# Patient Record
Sex: MALE | Race: WHITE | NOT HISPANIC OR LATINO | Employment: STUDENT | URBAN - NONMETROPOLITAN AREA
[De-identification: names, ages, dates, MRNs, and addresses within clinical notes are randomized per-mention and may not be internally consistent; named-entity substitution may affect disease eponyms.]

---
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
## 2018-07-30 ENCOUNTER — TELEPHONE (OUTPATIENT)
Dept: FAMILY MEDICINE | Facility: OTHER | Age: 18
End: 2018-07-30

## 2018-07-30 DIAGNOSIS — L60.0 INGROWING LEFT GREAT TOENAIL: Primary | ICD-10-CM

## 2018-07-30 NOTE — TELEPHONE ENCOUNTER
To: Nurse covering for Dr. Nolberto Delgado  Mom of patient is calling for son and would like a return phone call today (7/30/18).  She is requesting a referral to [facility] to take care of ingrown toenail problem.  Please call her back to discuss @ [phone] (this is her work number)  Thank you

## 2018-09-21 ENCOUNTER — TELEPHONE (OUTPATIENT)
Dept: PEDIATRICS | Facility: OTHER | Age: 18
End: 2018-09-21

## 2018-09-21 NOTE — TELEPHONE ENCOUNTER
Spoke with mom Elyse.  Insurance does not consider [facility] as in-network, and the referral needs to come from a primary clinic.  She had asked if it could come from the podiatrist but would not be accepted. Spoke with Va and she stated that he needs to be seen at a [facility] in his area ([facility]).    Rachel Peoples MA on 9/21/2018 at 4:49 PM

## 2018-09-21 NOTE — TELEPHONE ENCOUNTER
2:37 PM    Reason for Call: Phone Call    Description: Patients mother called stating that her son went into [facility] @ the [facility].  Pt has an appt set for Tues / Wed of next week for his toe. Mother states that he needs a referral for insurance to cover his foot procedure.  I did see Va Yu has seen the pt in the past for ingrown toenails. It was suggest by the mother if I would ask if Va could do a referral.      Was an appointment offered for this call? No  If yes : Appointment type              Date    Preferred method for responding to this message: Telephone Call  What is your phone number ? preferrred to be called @ her work no. Which is [phone]. Mother works til 5    If we cannot reach you directly, may we leave a detailed response at the number you provided? Yes    Can this message wait until your PCP/provider returns, if available today? Not applicable, provider is in today.    Koki Amaro

## 2018-09-21 NOTE — TELEPHONE ENCOUNTER
I saw the patient once 9 months ago for ear infection. I haven't even seen his feet. The referral should have been sent by the health services at the [facility], if that is what they saw him for.

## 2018-09-24 NOTE — TELEPHONE ENCOUNTER
Spoke with mom, Elyse, to let her know that Levi will need to be seen by a provider in the [facility], that is in network, to get the referral for the surgery.  We are unable to provide the referral because a provider has not physically seen the patient for the ingrown toenail.    Rachle Peoples MA on 9/24/2018 at 11:43 AM

## 2019-01-10 ENCOUNTER — OFFICE VISIT (OUTPATIENT)
Dept: PEDIATRICS | Facility: OTHER | Age: 19
End: 2019-01-10
Attending: NURSE PRACTITIONER
Payer: COMMERCIAL

## 2019-01-10 VITALS
WEIGHT: 176 LBS | RESPIRATION RATE: 18 BRPM | SYSTOLIC BLOOD PRESSURE: 116 MMHG | DIASTOLIC BLOOD PRESSURE: 78 MMHG | OXYGEN SATURATION: 100 % | TEMPERATURE: 97.7 F | HEIGHT: 70 IN | BODY MASS INDEX: 25.2 KG/M2 | HEART RATE: 62 BPM

## 2019-01-10 DIAGNOSIS — H66.003 ACUTE SUPPURATIVE OTITIS MEDIA OF BOTH EARS WITHOUT SPONTANEOUS RUPTURE OF TYMPANIC MEMBRANES, RECURRENCE NOT SPECIFIED: Primary | ICD-10-CM

## 2019-01-10 PROCEDURE — 99213 OFFICE O/P EST LOW 20 MIN: CPT | Performed by: NURSE PRACTITIONER

## 2019-01-10 RX ORDER — AMOXICILLIN 875 MG
875 TABLET ORAL 2 TIMES DAILY
Qty: 14 TABLET | Refills: 0 | Status: SHIPPED | OUTPATIENT
Start: 2019-01-10 | End: 2019-01-17

## 2019-01-10 ASSESSMENT — ANXIETY QUESTIONNAIRES
3. WORRYING TOO MUCH ABOUT DIFFERENT THINGS: NOT AT ALL
7. FEELING AFRAID AS IF SOMETHING AWFUL MIGHT HAPPEN: NOT AT ALL
5. BEING SO RESTLESS THAT IT IS HARD TO SIT STILL: NOT AT ALL
2. NOT BEING ABLE TO STOP OR CONTROL WORRYING: NOT AT ALL
IF YOU CHECKED OFF ANY PROBLEMS ON THIS QUESTIONNAIRE, HOW DIFFICULT HAVE THESE PROBLEMS MADE IT FOR YOU TO DO YOUR WORK, TAKE CARE OF THINGS AT HOME, OR GET ALONG WITH OTHER PEOPLE: NOT DIFFICULT AT ALL
GAD7 TOTAL SCORE: 0
6. BECOMING EASILY ANNOYED OR IRRITABLE: NOT AT ALL
1. FEELING NERVOUS, ANXIOUS, OR ON EDGE: NOT AT ALL
4. TROUBLE RELAXING: NOT AT ALL

## 2019-01-10 ASSESSMENT — MIFFLIN-ST. JEOR: SCORE: 1814.58

## 2019-01-10 ASSESSMENT — PAIN SCALES - GENERAL: PAINLEVEL: NO PAIN (1)

## 2019-01-10 ASSESSMENT — PATIENT HEALTH QUESTIONNAIRE - PHQ9: SUM OF ALL RESPONSES TO PHQ QUESTIONS 1-9: 0

## 2019-01-10 NOTE — NURSING NOTE
"Chief Complaint   Patient presents with     Otalgia       Initial /78 (BP Location: Right arm, Patient Position: Sitting, Cuff Size: Adult Regular)   Pulse 62   Temp 97.7  F (36.5  C) (Tympanic)   Resp 18   Ht 1.778 m (5' 10\")   Wt 79.8 kg (176 lb)   SpO2 100%   BMI 25.25 kg/m   Estimated body mass index is 25.25 kg/m  as calculated from the following:    Height as of this encounter: 1.778 m (5' 10\").    Weight as of this encounter: 79.8 kg (176 lb).  Medication Reconciliation: complete    Ashley A. Lechevalier, LPN  "

## 2019-01-10 NOTE — PROGRESS NOTES
SUBJECTIVE:   Levi Hawkins is a 18 year old male who presents to clinic today for the following health issues:    Ear Pain      Duration: 2-3 Days    Description (location/character/radiation): Pain and ringing in both ears. Pain started in his left ear, but ringing in right ear    Intensity:  mild, moderate    Accompanying signs and symptoms: Had congestion about 10-14 days ago that resolved within about a week.    History (similar episodes/previous evaluation): None    Precipitating or alleviating factors: laying down makes the ringing and pain worse. No difficulty with sleep.    Therapies tried and outcome: None          Problem list and histories reviewed & adjusted, as indicated.  Additional history: as documented    Patient Active Problem List   Diagnosis     Intermittent asthma     Bronchospasm, exercise-induced     Past Surgical History:   Procedure Laterality Date     CIRCUMCISION         Social History     Tobacco Use     Smoking status: Never Smoker     Smokeless tobacco: Never Used     Tobacco comment: no passive exposure   Substance Use Topics     Alcohol use: No     Family History   Problem Relation Age of Onset     Heart Disease Maternal Grandmother      Cancer Maternal Grandmother         lung cancer     Cancer - colorectal Maternal Grandfather      Eye Disorder Maternal Grandfather      Other - See Comments Paternal Grandmother         brain anerysm         Current Outpatient Medications   Medication Sig Dispense Refill     adapalene (DIFFERIN) 0.1 % cream APPLY TOPICALLY EVERY EVENING FOR ACNE (Patient not taking: Reported on 1/10/2019) 45 g 1     albuterol (2.5 MG/3ML) 0.083% nebulizer solution USE 1 VIAL IN NEBULIZER 3 TIMES A DAY (Patient not taking: Reported on 1/10/2019) 90 vial 0     IBUPROFEN PO Take 200 mg by mouth Takes as directed PRN pain       loratadine (CLARITIN) 10 MG tablet Take 10 mg by mouth daily       VENTOLIN  (90 BASE) MCG/ACT inhaler INHALE 2 PUFFS INTO THE  "LUNGS EVERY 4 TO 6 HOURS AS NEEDED FOR SHORTNESS OF BREATH (Patient not taking: Reported on 1/10/2019) 18 g 0     Allergies   Allergen Reactions     Birch Trees      Cats      Cat/feline product derivatives     Seasonal Allergies        Reviewed and updated as needed this visit by clinical staff  Tobacco  Allergies  Meds  Problems  Med Hx  Surg Hx  Fam Hx  Soc Hx        Reviewed and updated as needed this visit by Provider  Tobacco  Allergies  Meds  Problems  Med Hx  Surg Hx  Fam Hx  Soc Hx          ROS:  Constitutional, HEENT, cardiovascular, pulmonary, gi and gu systems are negative, except as otherwise noted.    OBJECTIVE:     /78 (BP Location: Right arm, Patient Position: Sitting, Cuff Size: Adult Regular)   Pulse 62   Temp 97.7  F (36.5  C) (Tympanic)   Resp 18   Ht 1.778 m (5' 10\")   Wt 79.8 kg (176 lb)   SpO2 100%   BMI 25.25 kg/m    Body mass index is 25.25 kg/m .  GENERAL: healthy, alert and no distress  EYES: Eyes grossly normal to inspection, PERRL and conjunctivae and sclerae normal  HENT: normal cephalic/atraumatic, both ears: canals normal, TMs injected with purulent fluid noted, nose and mouth without ulcers or lesions, oropharynx clear and oral mucous membranes moist  NECK: no adenopathy, no asymmetry, masses, or scars and thyroid normal to palpation  RESP: lungs clear to auscultation - no rales, rhonchi or wheezes  CV: regular rate and rhythm, normal S1 S2, no S3 or S4, no murmur, click or rub, no peripheral edema and peripheral pulses strong    Diagnostic Test Results:  none     ASSESSMENT/PLAN:     1. Acute suppurative otitis media of both ears without spontaneous rupture of tympanic membranes, recurrence not specified  Amoxicillin twice daily for 7 days. Ensure adequate fluid intake, humidification. Acetaminophen and/or ibuprofen as needed for any discomfort.  - amoxicillin (AMOXIL) 875 MG tablet; Take 1 tablet (875 mg) by mouth 2 times daily for 7 days  Dispense: 14 " tablet; Refill: 0    Follow up: if symptoms are worsening or fail to improve.    BEAU Cullen [facility]

## 2019-01-11 ASSESSMENT — ANXIETY QUESTIONNAIRES: GAD7 TOTAL SCORE: 0

## 2019-02-06 DIAGNOSIS — L70.0 ACNE VULGARIS: ICD-10-CM

## 2019-02-07 RX ORDER — ADAPALENE 0.1 G/100G
CREAM TOPICAL
Qty: 45 G | Refills: 0 | Status: SHIPPED | OUTPATIENT
Start: 2019-02-07 | End: 2022-08-11

## 2019-02-07 NOTE — TELEPHONE ENCOUNTER
adapalene (DIFFERIN) 0.1 % external cream  Last Written Prescription Date:  8/18/16  Last Fill Quantity: 45g,   # refills: 1  Last Office Visit: 1/10/19  Future Office visit:

## 2020-01-02 ENCOUNTER — TELEPHONE (OUTPATIENT)
Dept: PEDIATRICS | Facility: OTHER | Age: 20
End: 2020-01-02

## 2020-01-02 ENCOUNTER — OFFICE VISIT (OUTPATIENT)
Dept: FAMILY MEDICINE | Facility: OTHER | Age: 20
End: 2020-01-02
Attending: PHYSICIAN ASSISTANT
Payer: COMMERCIAL

## 2020-01-02 VITALS
SYSTOLIC BLOOD PRESSURE: 122 MMHG | HEART RATE: 66 BPM | OXYGEN SATURATION: 98 % | TEMPERATURE: 97.4 F | BODY MASS INDEX: 25.05 KG/M2 | WEIGHT: 175 LBS | DIASTOLIC BLOOD PRESSURE: 74 MMHG | HEIGHT: 70 IN

## 2020-01-02 DIAGNOSIS — H65.92 OME (OTITIS MEDIA WITH EFFUSION), LEFT: Primary | ICD-10-CM

## 2020-01-02 PROCEDURE — 99213 OFFICE O/P EST LOW 20 MIN: CPT | Performed by: PHYSICIAN ASSISTANT

## 2020-01-02 RX ORDER — AMOXICILLIN 875 MG
875 TABLET ORAL 2 TIMES DAILY
Qty: 20 TABLET | Refills: 0 | Status: SHIPPED | OUTPATIENT
Start: 2020-01-02 | End: 2022-08-11

## 2020-01-02 ASSESSMENT — ASTHMA QUESTIONNAIRES
QUESTION_1 LAST FOUR WEEKS HOW MUCH OF THE TIME DID YOUR ASTHMA KEEP YOU FROM GETTING AS MUCH DONE AT WORK, SCHOOL OR AT HOME: NONE OF THE TIME
QUESTION_2 LAST FOUR WEEKS HOW OFTEN HAVE YOU HAD SHORTNESS OF BREATH: NOT AT ALL
ACT_TOTALSCORE: 25
QUESTION_5 LAST FOUR WEEKS HOW WOULD YOU RATE YOUR ASTHMA CONTROL: COMPLETELY CONTROLLED
QUESTION_4 LAST FOUR WEEKS HOW OFTEN HAVE YOU USED YOUR RESCUE INHALER OR NEBULIZER MEDICATION (SUCH AS ALBUTEROL): NOT AT ALL
QUESTION_3 LAST FOUR WEEKS HOW OFTEN DID YOUR ASTHMA SYMPTOMS (WHEEZING, COUGHING, SHORTNESS OF BREATH, CHEST TIGHTNESS OR PAIN) WAKE YOU UP AT NIGHT OR EARLIER THAN USUAL IN THE MORNING: NOT AT ALL

## 2020-01-02 ASSESSMENT — PAIN SCALES - GENERAL: PAINLEVEL: MODERATE PAIN (4)

## 2020-01-02 ASSESSMENT — MIFFLIN-ST. JEOR: SCORE: 1805.04

## 2020-01-02 NOTE — PATIENT INSTRUCTIONS
Thank you for choosing [facility].   I have office hours 8:00 am to 4:30 pm on Monday's, Wednesday's, Thursday's and Friday's. My nurse and I are out of the office every Tuesday.    Following your visit, when your labs and diagnostic testing have returned, I will review then and you will be contacted by my nurse.  If you are on My Chart, you can also view results there.    For refills, notify your pharmacy regarding what you need and the pharmacy will generate a refill request. Do not call my nurse as she is unable to process refill request. Please plan ahead and allow 3-5 days for refill requests.    You will generally receive a reminder call the day prior to your appointment.  If you cannot attend your appointment, please cancel your appointment with as much notice as possible.  If there is a pattern of failure to present for your appointments, I cannot provide consistent, meaningful, ongoing care for you. It is very important to me that you come in for your care, so we can best assist you with your health care needs.    IMPORTANT:  Please note that it is my standard of practice to NOT participate in prescribing ongoing requested Narcotic Analgesic therapy, and/or participate in the prescribing of other controlled substances.  My nurse and I am happy to assist you with the process of referral for alternative pain management as needed, and other treatment modalities including but not limited to:  Physical Therapy, Physical Medicine and Rehab, Counseling, Chiropractic Care, Orthopedic Care, and non-narcotic medication management.     In the event that you need to be seen for emergent concerns and I am out of office,  please see one of my colleagues for acute concerns.  You may also present to  or ER.  I appreciate the opportunity to serve you and look forward to supporting your healthcare needs in the future. Please contact me with any questions or concerns that you may  have.    Sincerely,      Yulia Swenson RN, PA-C

## 2020-01-02 NOTE — TELEPHONE ENCOUNTER
Patient called stating he has had an ear ache for 2 days and would like to be seen.  Scheduled with PCP Messi at 10

## 2020-01-02 NOTE — LETTER
My Asthma Action Plan    Name: Levi Hawkins   YOB: 2000  Date: 1/2/2020   My doctor: ILIANA Jeronimo   [facility]        My Rescue Medicine:   Albuterol inhaler (Proair/Ventolin/Proventil HFA)  2-4 puffs EVERY 4 HOURS as needed. Use a spacer if recommended by your provider.   My Asthma Severity:   Intermittent / Exercise Induced  Know your asthma triggers: pollens and animal dander             GREEN ZONE   Good Control    I feel good    No cough or wheeze    Can work, sleep and play without asthma symptoms       Take your asthma control medicine every day.     1. If exercise triggers your asthma, take your rescue medication    15 minutes before exercise or sports, and    During exercise if you have asthma symptoms  2. Spacer to use with inhaler: If you have a spacer, make sure to use it with your inhaler             YELLOW ZONE Getting Worse  I have ANY of these:    I do not feel good    Cough or wheeze    Chest feels tight    Wake up at night   1. Keep taking your Green Zone medications  2. Start taking your rescue medicine:    every 20 minutes for up to 1 hour. Then every 4 hours for 24-48 hours.  3. If you stay in the Yellow Zone for more than 12-24 hours, contact your doctor.  4. If you do not return to the Green Zone in 12-24 hours or you get worse, start taking your oral steroid medicine if prescribed by your provider.           RED ZONE Medical Alert - Get Help  I have ANY of these:    I feel awful    Medicine is not helping    Breathing getting harder    Trouble walking or talking    Nose opens wide to breathe       1. Take your rescue medicine NOW  2. If your provider has prescribed an oral steroid medicine, start taking it NOW  3. Call your doctor NOW  4. If you are still in the Red Zone after 20 minutes and you have not reached your doctor:    Take your rescue medicine again and    Call 911 or go to the emergency room right away    See your  regular doctor within 2 weeks of an Emergency Room or Urgent Care visit for follow-up treatment.          Annual Reminders:  Meet with Asthma Educator,  Flu Shot in the Fall, consider Pneumonia Vaccination for patients with asthma (aged 19 and older).    Pharmacy:    [facility] - [place], [place] - [address]  [id number] - [place], [place] - [address] [facility] [address]  [id number] - [place], [place] - [address] AT [facility] [address]    Electronically signed by ILIANA Jeronimo   Date: 01/02/20                    Asthma Triggers  How To Control Things That Make Your Asthma Worse    Triggers are things that make your asthma worse.  Look at the list below to help you find your triggers and   what you can do about them. You can help prevent asthma flare-ups by staying away from your triggers.      Trigger                                                          What you can do   Cigarette Smoke  Tobacco smoke can make asthma worse. Do not allow smoking in your home, car or around you.  Be sure no one smokes at a child s day care or school.  If you smoke, ask your health care provider for ways to help you quit.  Ask family members to quit too.  Ask your health care provider for a referral to Quit Plan to help you quit smoking, or call 0-758-492-PLAN.     Colds, Flu, Bronchitis  These are common triggers of asthma. Wash your hands often.  Don t touch your eyes, nose or mouth.  Get a flu shot every year.     Dust Mites  These are tiny bugs that live in cloth or carpet. They are too small to see. Wash sheets and blankets in hot water every week.   Encase pillows and mattress in dust mite proof covers.  Avoid having carpet if you can. If you have carpet, vacuum weekly.   Use a dust mask and HEPA vacuum.   Pollen and Outdoor Mold  Some people are allergic to trees, grass, or weed pollen, or molds. Try to keep your windows closed.  Limit time out  doors when pollen count is high.   Ask you health care provider about taking medicine during allergy season.     Animal Dander  Some people are allergic to skin flakes, urine or saliva from pets with fur or feathers. Keep pets with fur or feathers out of your home.    If you can t keep the pet outdoors, then keep the pet out of your bedroom.  Keep the bedroom door closed.  Keep pets off cloth furniture and away from stuffed toys.     Mice, Rats, and Cockroaches  Some people are allergic to the waste from these pests.   Cover food and garbage.  Clean up spills and food crumbs.  Store grease in the refrigerator.   Keep food out of the bedroom.   Indoor Mold  This can be a trigger if your home has high moisture. Fix leaking faucets, pipes, or other sources of water.   Clean moldy surfaces.  Dehumidify basement if it is damp and smelly.   Smoke, Strong Odors, and Sprays  These can reduce air quality. Stay away from strong odors and sprays, such as perfume, powder, hair spray, paints, smoke incense, paint, cleaning products, candles and new carpet.   Exercise or Sports  Some people with asthma have this trigger. Be active!  Ask your doctor about taking medicine before sports or exercise to prevent symptoms.    Warm up for 5-10 minutes before and after sports or exercise.     Other Triggers of Asthma  Cold air:  Cover your nose and mouth with a scarf.  Sometimes laughing or crying can be a trigger.  Some medicines and food can trigger asthma.

## 2020-01-02 NOTE — PROGRESS NOTES
Subjective     Levi Hawkins is a 19 year old male who presents to clinic today for the following health issues:    HPI   RESPIRATORY SYMPTOMS      Duration: yesterday    Description  ear pain bilateral    Severity: moderate    Accompanying signs and symptoms: None    History (predisposing factors):  none    Precipitating or alleviating factors: None    Therapies tried and outcome:  none        Patient Active Problem List   Diagnosis     Intermittent asthma     Bronchospasm, exercise-induced     Past Surgical History:   Procedure Laterality Date     CIRCUMCISION         Social History     Tobacco Use     Smoking status: Never Smoker     Smokeless tobacco: Never Used     Tobacco comment: no passive exposure   Substance Use Topics     Alcohol use: No     Family History   Problem Relation Age of Onset     Heart Disease Maternal Grandmother      Cancer Maternal Grandmother         lung cancer     Cancer - colorectal Maternal Grandfather      Eye Disorder Maternal Grandfather      Other - See Comments Paternal Grandmother         brain anerysm         Current Outpatient Medications   Medication Sig Dispense Refill     adapalene (DIFFERIN) 0.1 % external cream APPLY TOPICALLY EVERY EVENING FOR ACNE 45 g 0     albuterol (2.5 MG/3ML) 0.083% nebulizer solution USE 1 VIAL IN NEBULIZER 3 TIMES A DAY 90 vial 0     IBUPROFEN PO Take 200 mg by mouth Takes as directed PRN pain       loratadine (CLARITIN) 10 MG tablet Take 10 mg by mouth daily       VENTOLIN  (90 BASE) MCG/ACT inhaler INHALE 2 PUFFS INTO THE LUNGS EVERY 4 TO 6 HOURS AS NEEDED FOR SHORTNESS OF BREATH 18 g 0     Allergies   Allergen Reactions     Birch Trees      Cats      Cat/feline product derivatives     Seasonal Allergies      No lab results found.   BP Readings from Last 3 Encounters:   01/02/20 122/74   01/10/19 116/78   12/29/17 120/80    Wt Readings from Last 3 Encounters:   01/02/20 79.4 kg (175 lb)   01/10/19 79.8 kg (176 lb)   12/29/17 80.6 kg  "(177 lb 9.6 oz) (80 %)*     * Growth percentiles are based on CDC (Boys, 2-20 Years) data.                      Reviewed and updated as needed this visit by Provider         Review of Systems   ROS COMP: Constitutional, HEENT, cardiovascular, pulmonary, gi and gu systems are negative, except as otherwise noted.      Objective    /74   Pulse 66   Temp 97.4  F (36.3  C)   Ht 1.778 m (5' 10\")   Wt 79.4 kg (175 lb)   SpO2 98%   BMI 25.11 kg/m    Body mass index is 25.11 kg/m .  Physical Exam   GENERAL: healthy, alert and no distress  EYES: Eyes grossly normal to inspection, PERRL and conjunctivae and sclerae normal  HENT: ear canals and TM's noted's blood on left ear drum and fluid right is showing fluid but clear.  nose and mouth without ulcers or lesions  NECK: no adenopathy, no asymmetry, masses, or scars and thyroid normal to palpation  RESP: lungs clear to auscultation - no rales, rhonchi or wheezes  CV: regular rate and rhythm, normal S1 S2, no S3 or S4, no murmur, click or rub, no peripheral edema and peripheral pulses strong  ABDOMEN: soft, nontender, no hepatosplenomegaly, no masses and bowel sounds normal  MS: no gross musculoskeletal defects noted, no edema  SKIN: no suspicious lesions or rashes  BACK: no CVA tenderness, no paralumbar tenderness  LYMPH: mild  Cervical nodes , no supraclavicular,     Diagnostic Test Results:  Labs reviewed in Epic  No results found for this or any previous visit (from the past 24 hour(s)).        Assessment & Plan     1. OME (otitis media with effusion), left  Blood behind his drum.  Painful. Cover with Amoxil. See us back if not getting better.    - amoxicillin (AMOXIL) 875 MG tablet; Take 1 tablet (875 mg) by mouth 2 times daily  Dispense: 20 tablet; Refill: 0       BMI:   Estimated body mass index is 25.11 kg/m  as calculated from the following:    Height as of this encounter: 1.778 m (5' 10\").    Weight as of this encounter: 79.4 kg (175 lb).           See " Patient Instructions    No follow-ups on file.    ILIANA Jeronimo  [facility]

## 2020-01-02 NOTE — NURSING NOTE
"Chief Complaint   Patient presents with     Ear Problem       Initial /74   Pulse 66   Temp 97.4  F (36.3  C)   Ht 1.778 m (5' 10\")   Wt 79.4 kg (175 lb)   SpO2 98%   BMI 25.11 kg/m   Estimated body mass index is 25.11 kg/m  as calculated from the following:    Height as of this encounter: 1.778 m (5' 10\").    Weight as of this encounter: 79.4 kg (175 lb).  Medication Reconciliation: complete  Simon Moore LPN  "

## 2020-01-03 ASSESSMENT — ASTHMA QUESTIONNAIRES: ACT_TOTALSCORE: 25

## 2022-08-11 ENCOUNTER — OFFICE VISIT (OUTPATIENT)
Dept: FAMILY MEDICINE | Facility: CLINIC | Age: 22
End: 2022-08-11
Payer: COMMERCIAL

## 2022-08-11 VITALS
DIASTOLIC BLOOD PRESSURE: 74 MMHG | SYSTOLIC BLOOD PRESSURE: 120 MMHG | HEART RATE: 60 BPM | BODY MASS INDEX: 24.62 KG/M2 | OXYGEN SATURATION: 100 % | TEMPERATURE: 97.9 F | HEIGHT: 70 IN | WEIGHT: 172 LBS

## 2022-08-11 DIAGNOSIS — R21 RASH: Primary | ICD-10-CM

## 2022-08-11 PROCEDURE — 99213 OFFICE O/P EST LOW 20 MIN: CPT | Performed by: NURSE PRACTITIONER

## 2022-08-11 RX ORDER — TRIAMCINOLONE ACETONIDE 1 MG/G
OINTMENT TOPICAL 2 TIMES DAILY
Qty: 15 G | Refills: 1 | Status: SHIPPED | OUTPATIENT
Start: 2022-08-11

## 2022-08-11 NOTE — PROGRESS NOTES
"  Assessment & Plan   Problem List Items Addressed This Visit    None     Visit Diagnoses     Rash    -  Primary    Relevant Medications    triamcinolone (KENALOG) 0.1 % external ointment         Pt presents with macular rash to bilateral ankles. Rash is non pruritic but uncomfortable. Had similar rash 6 years ago, went to dermatology and was given triamcinolone ointment. Will treat with triamcinolone BID for up to 7 days.       BEAU Page CNP  [facility] IVANA Sims is a 22 year old, presenting for the following health issues:  No chief complaint on file.      HPI     Rash on ankles, noticed on Saturday morning, no new exposures, no changes in routine  Not pruritic but uncomfortable  Unchanged  He had the same a few years ago at the end of summer also. Got triamcinolone that helped. He still has it but it is        Review of Systems   Detailed as above       Objective    /74 (BP Location: Right arm, Patient Position: Sitting, Cuff Size: Adult Regular)   Pulse 60   Temp 97.9  F (36.6  C)   Ht 1.778 m (5' 10\")   Wt 78 kg (172 lb)   SpO2 100%   BMI 24.68 kg/m    Body mass index is 24.68 kg/m .  Physical Exam  Constitutional:       Appearance: Normal appearance.   Pulmonary:      Effort: Pulmonary effort is normal.   Skin:     Findings: Rash present. Rash is macular (multiple tiny lesions Bilateral ankles).   Neurological:      Mental Status: He is alert.   Psychiatric:         Mood and Affect: Mood normal.         Behavior: Behavior normal.                .  ..   I saw this patient in collaboration with Evelin Arvizu      I was present with the BEAU/PA student who participated in the service and in the documentation of the services provided. I have verified the history and personally performed the physical exam and medical decision making, as documented by the student and edited by me.     BEAU Wyatt, MÓNICA Arvizu NP Student      "